# Patient Record
Sex: FEMALE | Race: WHITE
[De-identification: names, ages, dates, MRNs, and addresses within clinical notes are randomized per-mention and may not be internally consistent; named-entity substitution may affect disease eponyms.]

---

## 2018-02-05 ENCOUNTER — HOSPITAL ENCOUNTER (OUTPATIENT)
Dept: HOSPITAL 96 - M.WC | Age: 48
End: 2018-02-05
Attending: EMERGENCY MEDICINE
Payer: COMMERCIAL

## 2018-02-05 DIAGNOSIS — L97.511: ICD-10-CM
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
## 2018-02-05 DIAGNOSIS — S97.81XD: Primary | ICD-10-CM

## 2018-02-05 DIAGNOSIS — Z87.891: ICD-10-CM

## 2018-02-12 ENCOUNTER — HOSPITAL ENCOUNTER (OUTPATIENT)
Dept: HOSPITAL 96 - M.WC | Age: 48
End: 2018-02-12
Payer: COMMERCIAL

## 2018-02-12 DIAGNOSIS — L97.511: Primary | ICD-10-CM

## 2018-02-12 DIAGNOSIS — Z87.891: ICD-10-CM

## 2018-02-19 ENCOUNTER — HOSPITAL ENCOUNTER (OUTPATIENT)
Dept: HOSPITAL 96 - M.WC | Age: 48
End: 2018-02-19
Payer: COMMERCIAL

## 2018-02-19 DIAGNOSIS — Z87.891: ICD-10-CM

## 2018-02-19 DIAGNOSIS — L97.511: Primary | ICD-10-CM

## 2018-02-26 ENCOUNTER — HOSPITAL ENCOUNTER (OUTPATIENT)
Dept: HOSPITAL 96 - M.WC | Age: 48
End: 2018-02-26
Payer: COMMERCIAL

## 2018-02-26 DIAGNOSIS — Z87.891: ICD-10-CM

## 2018-02-26 DIAGNOSIS — L97.511: Primary | ICD-10-CM

## 2018-03-05 ENCOUNTER — HOSPITAL ENCOUNTER (OUTPATIENT)
Dept: HOSPITAL 96 - M.WC | Age: 48
End: 2018-03-05
Payer: COMMERCIAL

## 2018-03-05 DIAGNOSIS — Z87.891: ICD-10-CM

## 2018-03-05 DIAGNOSIS — L97.511: Primary | ICD-10-CM

## 2018-03-12 ENCOUNTER — HOSPITAL ENCOUNTER (OUTPATIENT)
Dept: HOSPITAL 96 - M.WC | Age: 48
End: 2018-03-12
Payer: COMMERCIAL

## 2018-03-12 DIAGNOSIS — Z87.891: ICD-10-CM

## 2018-03-12 DIAGNOSIS — L97.511: Primary | ICD-10-CM

## 2018-03-16 ENCOUNTER — HOSPITAL ENCOUNTER (OUTPATIENT)
Dept: HOSPITAL 96 - M.WC | Age: 48
End: 2018-03-16
Payer: COMMERCIAL

## 2018-03-16 DIAGNOSIS — Z87.891: ICD-10-CM

## 2018-03-16 DIAGNOSIS — L97.511: Primary | ICD-10-CM

## 2018-03-19 ENCOUNTER — HOSPITAL ENCOUNTER (OUTPATIENT)
Dept: HOSPITAL 96 - M.WC | Age: 48
End: 2018-03-19
Payer: COMMERCIAL

## 2018-03-19 DIAGNOSIS — L97.511: Primary | ICD-10-CM

## 2018-03-19 DIAGNOSIS — Z87.891: ICD-10-CM

## 2018-03-26 ENCOUNTER — HOSPITAL ENCOUNTER (OUTPATIENT)
Dept: HOSPITAL 96 - M.WC | Age: 48
End: 2018-03-26
Payer: COMMERCIAL

## 2018-03-26 DIAGNOSIS — Z87.891: ICD-10-CM
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
## 2018-03-26 DIAGNOSIS — L97.511: ICD-10-CM

## 2018-03-26 DIAGNOSIS — S91.301D: Primary | ICD-10-CM

## 2018-04-02 ENCOUNTER — HOSPITAL ENCOUNTER (OUTPATIENT)
Dept: HOSPITAL 96 - M.SUR | Age: 48
Discharge: HOME | End: 2018-04-02
Payer: COMMERCIAL
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
## 2018-04-02 DIAGNOSIS — I96: ICD-10-CM

## 2018-04-02 DIAGNOSIS — S91.301A: Primary | ICD-10-CM

## 2018-04-02 DIAGNOSIS — L97.511: ICD-10-CM

## 2018-04-02 DIAGNOSIS — Z98.890: ICD-10-CM

## 2018-04-02 LAB
ANION GAP SERPL CALC-SCNC: 8 MMOL/L (ref 7–16)
BUN SERPL-MCNC: 12 MG/DL (ref 7–18)
CALCIUM SERPL-MCNC: 9.6 MG/DL (ref 8.5–10.1)
CHLORIDE SERPL-SCNC: 106 MMOL/L (ref 98–107)
CO2 SERPL-SCNC: 26 MMOL/L (ref 21–32)
CREAT SERPL-MCNC: 0.7 MG/DL (ref 0.6–1.3)
GLUCOSE SERPL-MCNC: 90 MG/DL (ref 70–99)
HCT VFR BLD CALC: 40.7 % (ref 37–47)
HGB BLD-MCNC: 13.7 GM/DL (ref 12–15)
MCH RBC QN AUTO: 28.5 PG (ref 26–34)
MCHC RBC AUTO-ENTMCNC: 33.6 G/DL (ref 28–37)
MCV RBC: 84.6 FL (ref 80–100)
MPV: 7.6 FL. (ref 7.2–11.1)
PLATELET COUNT*: 378 THOU/UL (ref 150–400)
POTASSIUM SERPL-SCNC: 3.9 MMOL/L (ref 3.5–5.1)
RBC # BLD AUTO: 4.81 MIL/UL (ref 4.2–5)
RDW-CV: 14 % (ref 10.5–14.5)
SODIUM SERPL-SCNC: 140 MMOL/L (ref 136–145)
WBC # BLD AUTO: 9.4 THOU/UL (ref 4–11)

## 2018-04-02 NOTE — H
[address]  [address]                    HISTORY AND PHYSICAL          
_______________________________________________________________________________
 
Name:       JACQUI BROTHERS                Room:                      [facility]#:  [id number]      Account #:      [id number]  
Admission:  04/02/18     Attend Phys:    Julisa Woodard MD   
Discharge:               Date of Birth:  [birth year]  
         Report #: [id number]
                                                                     [id number]  
_______________________________________________________________________________
THIS REPORT FOR:  //name//                      
 
CC: Davie Woodard
 
ADMITTING DIAGNOSIS:  Crush injury with skin loss, right foot, including dorsum
and plantar surfaces of the right heel.
 
HISTORY OF PRESENT ILLNESS:  The patient is a 48-year-old white female who
presented 1 month after a crush injury involving a forklift over the right foot,
which resulted in a large area of skin loss and it is now being prepared for
surgical debridement and wound care treatment.
 
PAST MEDICAL HISTORY:  Unremarkable.
 
PAST SURGICAL HISTORY:  Only includes a tubal ligation.
 
MEDICATIONS:  Norco, Ambien and Xanax as needed.
 
ALLERGIES:  She has no other known drug allergies.
 
SOCIAL HISTORY:  She does not smoke or drink.
 
REVIEW OF SYSTEMS:  Otherwise unremarkable except for the distress of the
injury.
 
PHYSICAL EXAMINATION:
GENERAL:  She is a modestly obese, well-developed female.
HEAD, EYES, EARS, NOSE AND THROAT:  Unremarkable.
NECK:  Supple.
LUNGS:  Clear.
HEART:  Regular rate and rhythm without murmur.
ABDOMEN:  Benign.  On her extremity of note, she has the right wound on the foot
that on last measurements measured 13.5 cm in length, 15.5 cm in width and 0.5
cm in depth.  There is dried blackened eschar across the base of the heel and
bottom dorsum portion of the foot and the plantar surface wound and dorsum
surface on the medial aspect of the right foot near the great toe.
 
LABORATORY DATA:  Unnecessary.
 
ASSESSMENT AND PLAN:  The plan is for surgical debridement of his blackened
remaining eschar and placement of EpiFix and negative pressure wound therapy to
the wound bed.  I have outlined the surgical debridement of the eschar off the
heel and forefoot, which is starting to loosen.  I have outlined the repeated
needs of repetitive treatments to close and heal these wounds.  She understands
 
 
 
[place] [address]                    HISTORY AND PHYSICAL          
_______________________________________________________________________________
 
Name:       DENEENJACQUI L                Room:                      [facility].#:  [id number]      Account #:      [id number]  
Admission:  04/02/18     Attend Phys:    Julisa Woodard MD   
Discharge:               Date of Birth:  [birth year]  
         Report #: [id number]
                                                                     [id number]  
_______________________________________________________________________________
the risks and benefits of the surgery and wishes to proceed with surgical
treatment.
 
 
 
 
 
 
 
 
 
 
 
 
 
 
 
 
 
 
 
 
 
 
 
 
 
 
 
 
 
 
 
 
 
 
 
 
 
 
 
 
 
 
<ELECTRONICALLY SIGNED>
                                        By:  Julisa Woodard MD            
04/02/18     1128
D: 04/01/18 1528_______________________________________
T: 04/01/18 1653Kkali Woodard MD               /nt

## 2018-04-03 NOTE — S
[address]  [address]                    SURGICAL PATH RPT PROCEDURE   
_______________________________________________________________________________
 
Name:       JACQUI BANKS                Room:                      [facility]#:  [id number]      Account #:      [id number]  
Admission:  04/02/18     Date of Birth:  [birth year]  
Discharge:                             Report #:    [id number]
                                                         Path Case #: [id number] 
_______________________________________________________________________________
 
  
PATHOLOGY REPORT 
    
COLLECTION DATE: 4/2/2018   RECEIVED DATE: 4/2/2018  
 SUBMITTING PHYS: Dr. Julisa Woodard
OTHER PHYS:
Dr. Davie Roldan
   
SPECIMEN(S) RECEIVED:
A.Right heel tissue
    
* * * * * * * * * * * *
   
FINAL DIAGNOSIS:
Right heel:
- Benign skin and fibrofatty tissue with prominent necrosis and acute
and chronic inflammation.
(SONJA:mgr; 4/3/2018)  
 
PATHOLOGIST:   Ifeanyi Kaufman M.D. 
REPORT ELECTRONICALLY SIGNED BY:   Ifeanyi Kaufman M.D.
DATE/TIME:   4/3/2018 17:11
    
* * * * * * * * * * * *
 
GROSS PATHOLOGY:
The specimen is received in formalin, labeled "Jacqui Banks right
heel," and consists of 2 segments of dark brown necrotic skin
measuring 8.5 x 2.9 x 0.5 cm and 8.0 x 3.9 x 0.5 cm.  Representative
sections are submitted in cassette A1.
(SDY; 4/2/2018)
 
 
CLINICAL HISTORY:
Crushed right foot
Crushing injury right foot, non-pressure chronic ulcer
 
INITIAL CPT CODE(S):
A; 10628
Professional services performed by [facility] at [facility], [address] [place] [address].  Technical
services performed by [facility] at [address] [place] [address], [address],
[place] [address].
 
 
   
[facility]
[address]  [address]                    SURGICAL PATH RPT PROCEDURE   
_______________________________________________________________________________
 
Name:       JACQUI BANKS                Room:                      [facility] 
JOHN#:  [id number]      Account #:      [id number]  
Admission:  04/02/18     Date of Birth:  [birth year]  
Discharge:                             Report #:    [id number]
                                                         Path Case #: [id number] 
_______________________________________________________________________________
[place] [address]
PHONE:  [phone]
DIRECTOR:  Spencer W. Kerley, M.D.
* * *  END OF REPORT  * * *

## 2018-04-09 ENCOUNTER — HOSPITAL ENCOUNTER (OUTPATIENT)
Dept: HOSPITAL 96 - M.WC | Age: 48
End: 2018-04-09
Payer: COMMERCIAL

## 2018-04-09 DIAGNOSIS — S91.301D: Primary | ICD-10-CM

## 2018-04-09 DIAGNOSIS — Z87.891: ICD-10-CM
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
## 2018-04-12 ENCOUNTER — HOSPITAL ENCOUNTER (OUTPATIENT)
Dept: HOSPITAL 96 - M.WC | Age: 48
End: 2018-04-12
Payer: COMMERCIAL
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
## 2018-04-12 DIAGNOSIS — Z87.891: ICD-10-CM

## 2018-04-12 DIAGNOSIS — S91.301D: Primary | ICD-10-CM

## 2018-04-16 ENCOUNTER — HOSPITAL ENCOUNTER (OUTPATIENT)
Dept: HOSPITAL 96 - M.WC | Age: 48
End: 2018-04-16
Payer: COMMERCIAL

## 2018-04-16 DIAGNOSIS — Z87.891: ICD-10-CM

## 2018-04-16 DIAGNOSIS — L97.511: Primary | ICD-10-CM

## 2018-04-16 NOTE — OP
[address]  [address]                    OPERATIVE REPORT              
_______________________________________________________________________________
 
Name:       JACQUI BROTHERS ABELARDO                Room:                      [facility]#:  [id number]      Account #:      [id number]  
Admission:  04/02/18     Attend Phys:    Julisa Woodard MD   
Discharge:               Date of Birth:  [birth year]  
         Report #: [id number]
                                                                     [id number]  
_______________________________________________________________________________
THIS REPORT FOR:  //name//                      
 
CC: Davie Woodard
 
DATE OF SERVICE:  04/02/2018
 
 
PREOPERATIVE DIAGNOSIS:  Necrotic skin loss over the plantar and posterior heel
and dorsum of the foot.  The heel wound measured 15.5 x 13 cm and the forefoot
wound measured 1 x 0.4 cm.
 
POSTOPERATIVE DIAGNOSIS:  Necrotic skin loss over the plantar and posterior heel
and dorsum of the foot.  The heel wound measured 15.5 x 13 cm and the forefoot
wound measured 1 x 0.4 cm.
 
OPERATIVE PROCEDURE:  Excisional debridement of a 15.5 x 13 cm right heel skin
and subcutaneous tissue and 1 x 0.4 cm forefoot wound, application of a 16 x 6
cm AmnioFix
with security with staples and then placement of Adaptic and a 20 x 25
negative pressure wound therapy device, GLORIA.
 
ANESTHESIA:  General endotracheal.
 
DESCRIPTION OF PROCEDURE:  After the patient was placed under general
endotracheal anesthesia and placed in a left lateral decubitus position, the
right foot, heel, up to the knee was carefully prepped and sterilely draped in a
sterile fashion.  A timeout taken, antibiotics administered.  I began by sharply
excising off the forefoot wound necrotic skin and a 1 x 0.4 cm was left exposed
and was debrided back sharply with a #15 scalpel blade.  Then, the entire heel,
the 15.5 x 13 was sharply debrided off from the back of the heel towards the
lateral part of the heel to the medial part of the heel in pieces and the entire
necrotic skin bed was completely excised.  After controlling bleeding points on
the subcutaneous tissue with cautery and sharply debriding the subcutaneous fat
back to healthy bleeding tissues in the entire area, it was rinsed and dried.  I
then took a 16 x 6 cm AmnioFix
and cut it to allow coverage of the entire area of
exposed wound including the forefoot.  I then covered both the heel area with
Adaptic and secured first the Amniofix with staples and then the Adaptic with
Steri-Strips and then I placed a Mepilex bandage over the forefoot and then
placed the 20 x 25 GLORIA covering the entire wound bed of the heel and forefoot
and secured it with the surrounding tapes and put it in negative pressure of 80
mmHg with good seal, and placed an Ace bandage and that ended the operative
procedure.  Estimated blood loss of 5 mL.  Specimen of necrotic skin was sent
 
 
 
[place] [address]                    OPERATIVE REPORT              
_______________________________________________________________________________
 
Name:       JACQUI BROTHERS                Room:                      [facility] 
JOHN#:  [id number]      Account #:      [id number]  
Admission:  04/02/18     Attend Phys:    Julisa Woodard MD   
Discharge:               Date of Birth:  [birth year]  
         Report #: [id number]
                                                                     [id number]  
_______________________________________________________________________________
for culture.  The patient was extubated, returned to recovery in satisfactory
condition.
 
 
 
 
 
 
 
 
 
 
 
 
 
 
 
 
 
 
 
 
 
 
 
 
 
 
 
 
 
 
 
 
 
 
 
 
 
 
 
 
 
 
<ELECTRONICALLY SIGNED>
                                        By:  Julisa Woodard MD            
04/16/18     0908
D: 04/02/18 1349_______________________________________
T: 04/02/18 1407Julisa Woodard MD               /nt

## 2018-04-19 ENCOUNTER — HOSPITAL ENCOUNTER (OUTPATIENT)
Dept: HOSPITAL 96 - M.WC | Age: 48
End: 2018-04-19
Payer: COMMERCIAL

## 2018-04-19 DIAGNOSIS — L97.511: ICD-10-CM
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
## 2018-04-19 DIAGNOSIS — S97.81XD: Primary | ICD-10-CM

## 2018-04-19 DIAGNOSIS — Z87.891: ICD-10-CM

## 2018-04-23 ENCOUNTER — HOSPITAL ENCOUNTER (OUTPATIENT)
Dept: HOSPITAL 96 - M.WC | Age: 48
End: 2018-04-23
Attending: FAMILY MEDICINE
Payer: COMMERCIAL

## 2018-04-23 DIAGNOSIS — Z87.891: ICD-10-CM

## 2018-04-23 DIAGNOSIS — L97.511: Primary | ICD-10-CM

## 2018-04-26 ENCOUNTER — HOSPITAL ENCOUNTER (OUTPATIENT)
Dept: HOSPITAL 96 - M.WC | Age: 48
End: 2018-04-26
Payer: COMMERCIAL

## 2018-04-26 DIAGNOSIS — S91.301D: Primary | ICD-10-CM

## 2018-04-26 DIAGNOSIS — L97.511: ICD-10-CM
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
## 2018-04-26 DIAGNOSIS — Z87.891: ICD-10-CM

## 2018-04-30 ENCOUNTER — HOSPITAL ENCOUNTER (OUTPATIENT)
Dept: HOSPITAL 96 - M.WC | Age: 48
End: 2018-04-30
Payer: COMMERCIAL

## 2018-04-30 DIAGNOSIS — L97.511: Primary | ICD-10-CM

## 2018-04-30 DIAGNOSIS — Z87.891: ICD-10-CM

## 2018-05-07 ENCOUNTER — HOSPITAL ENCOUNTER (OUTPATIENT)
Dept: HOSPITAL 96 - M.WC | Age: 48
End: 2018-05-07
Payer: COMMERCIAL

## 2018-05-07 DIAGNOSIS — Z87.891: ICD-10-CM

## 2018-05-07 DIAGNOSIS — L97.511: Primary | ICD-10-CM

## 2018-05-15 ENCOUNTER — HOSPITAL ENCOUNTER (OUTPATIENT)
Dept: HOSPITAL 96 - M.WC | Age: 48
End: 2018-05-15
Payer: COMMERCIAL

## 2018-05-15 DIAGNOSIS — L97.511: Primary | ICD-10-CM

## 2018-05-15 DIAGNOSIS — Z87.891: ICD-10-CM

## 2018-05-21 ENCOUNTER — HOSPITAL ENCOUNTER (OUTPATIENT)
Dept: HOSPITAL 96 - M.WC | Age: 48
End: 2018-05-21
Payer: COMMERCIAL
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
## 2018-05-21 DIAGNOSIS — S91.301D: Primary | ICD-10-CM

## 2018-05-21 DIAGNOSIS — Z87.891: ICD-10-CM

## 2018-05-25 ENCOUNTER — HOSPITAL ENCOUNTER (OUTPATIENT)
Dept: HOSPITAL 96 - M.WC | Age: 48
End: 2018-05-25
Payer: COMMERCIAL

## 2018-05-25 DIAGNOSIS — Z87.891: ICD-10-CM

## 2018-05-25 DIAGNOSIS — L97.511: Primary | ICD-10-CM

## 2018-06-01 ENCOUNTER — HOSPITAL ENCOUNTER (OUTPATIENT)
Dept: HOSPITAL 96 - M.WC | Age: 48
End: 2018-06-01
Payer: COMMERCIAL

## 2018-06-01 DIAGNOSIS — L97.511: Primary | ICD-10-CM

## 2018-06-01 DIAGNOSIS — Z87.891: ICD-10-CM

## 2018-06-04 ENCOUNTER — HOSPITAL ENCOUNTER (OUTPATIENT)
Dept: HOSPITAL 96 - M.WC | Age: 48
End: 2018-06-04
Payer: COMMERCIAL

## 2018-06-04 DIAGNOSIS — Z87.891: ICD-10-CM

## 2018-06-04 DIAGNOSIS — L97.511: Primary | ICD-10-CM

## 2018-06-11 ENCOUNTER — HOSPITAL ENCOUNTER (OUTPATIENT)
Dept: HOSPITAL 96 - M.WC | Age: 48
End: 2018-06-11
Payer: COMMERCIAL

## 2018-06-11 DIAGNOSIS — Z87.891: ICD-10-CM

## 2018-06-11 DIAGNOSIS — L97.511: Primary | ICD-10-CM

## 2018-06-18 ENCOUNTER — HOSPITAL ENCOUNTER (OUTPATIENT)
Dept: HOSPITAL 96 - M.WC | Age: 48
End: 2018-06-18
Payer: COMMERCIAL

## 2018-06-18 DIAGNOSIS — L97.511: Primary | ICD-10-CM

## 2018-06-18 DIAGNOSIS — Z87.891: ICD-10-CM

## 2018-06-25 ENCOUNTER — HOSPITAL ENCOUNTER (OUTPATIENT)
Dept: HOSPITAL 96 - M.WC | Age: 48
End: 2018-06-25
Payer: COMMERCIAL
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
## 2018-06-25 DIAGNOSIS — S91.301D: Primary | ICD-10-CM

## 2018-06-25 DIAGNOSIS — Z87.891: ICD-10-CM

## 2018-07-02 ENCOUNTER — HOSPITAL ENCOUNTER (OUTPATIENT)
Dept: HOSPITAL 96 - M.WC | Age: 48
End: 2018-07-02
Payer: COMMERCIAL

## 2018-07-02 DIAGNOSIS — L97.511: Primary | ICD-10-CM

## 2018-07-02 DIAGNOSIS — Z87.891: ICD-10-CM

## 2018-07-09 ENCOUNTER — HOSPITAL ENCOUNTER (OUTPATIENT)
Dept: HOSPITAL 96 - M.WC | Age: 48
End: 2018-07-09
Payer: COMMERCIAL

## 2018-07-09 DIAGNOSIS — Z87.891: ICD-10-CM

## 2018-07-09 DIAGNOSIS — L97.511: Primary | ICD-10-CM

## 2018-07-16 ENCOUNTER — HOSPITAL ENCOUNTER (OUTPATIENT)
Dept: HOSPITAL 96 - M.WC | Age: 48
End: 2018-07-16
Attending: FAMILY MEDICINE
Payer: COMMERCIAL

## 2018-07-16 DIAGNOSIS — Z87.891: ICD-10-CM

## 2018-07-16 DIAGNOSIS — L97.512: Primary | ICD-10-CM

## 2018-07-23 ENCOUNTER — HOSPITAL ENCOUNTER (OUTPATIENT)
Dept: HOSPITAL 96 - M.WC | Age: 48
End: 2018-07-23
Payer: COMMERCIAL

## 2018-07-23 DIAGNOSIS — L97.511: Primary | ICD-10-CM

## 2018-07-23 DIAGNOSIS — S97.81XD: ICD-10-CM

## 2018-07-23 DIAGNOSIS — Z87.891: ICD-10-CM
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
## 2018-07-30 ENCOUNTER — HOSPITAL ENCOUNTER (OUTPATIENT)
Dept: HOSPITAL 96 - M.WC | Age: 48
End: 2018-07-30
Payer: COMMERCIAL
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
## 2018-07-30 DIAGNOSIS — Z87.891: ICD-10-CM

## 2018-07-30 DIAGNOSIS — S97.81XD: ICD-10-CM

## 2018-07-30 DIAGNOSIS — L97.511: Primary | ICD-10-CM

## 2018-08-06 ENCOUNTER — HOSPITAL ENCOUNTER (OUTPATIENT)
Dept: HOSPITAL 96 - M.WC | Age: 48
End: 2018-08-06
Payer: COMMERCIAL

## 2018-08-06 DIAGNOSIS — S97.81XD: ICD-10-CM

## 2018-08-06 DIAGNOSIS — L97.511: Primary | ICD-10-CM
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
## 2018-08-06 DIAGNOSIS — Z87.891: ICD-10-CM

## 2018-08-13 ENCOUNTER — HOSPITAL ENCOUNTER (OUTPATIENT)
Dept: HOSPITAL 96 - M.WC | Age: 48
End: 2018-08-13
Payer: COMMERCIAL

## 2018-08-13 DIAGNOSIS — L97.511: Primary | ICD-10-CM

## 2018-08-13 DIAGNOSIS — Z87.891: ICD-10-CM

## 2018-08-20 ENCOUNTER — HOSPITAL ENCOUNTER (OUTPATIENT)
Dept: HOSPITAL 96 - M.WC | Age: 48
End: 2018-08-20
Payer: COMMERCIAL

## 2018-08-20 DIAGNOSIS — Z87.891: ICD-10-CM

## 2018-08-20 DIAGNOSIS — L97.518: Primary | ICD-10-CM

## 2019-01-07 ENCOUNTER — HOSPITAL ENCOUNTER (OUTPATIENT)
Dept: HOSPITAL 96 - M.WC | Age: 49
End: 2019-01-07
Payer: COMMERCIAL

## 2019-01-07 DIAGNOSIS — F41.9: ICD-10-CM

## 2019-01-07 DIAGNOSIS — L97.511: Primary | ICD-10-CM

## 2019-01-07 DIAGNOSIS — Z87.891: ICD-10-CM
